# Patient Record
Sex: MALE | Race: BLACK OR AFRICAN AMERICAN | Employment: UNEMPLOYED | URBAN - METROPOLITAN AREA
[De-identification: names, ages, dates, MRNs, and addresses within clinical notes are randomized per-mention and may not be internally consistent; named-entity substitution may affect disease eponyms.]

---

## 2020-09-13 ENCOUNTER — HOSPITAL ENCOUNTER (EMERGENCY)
Age: 45
Discharge: HOME | End: 2020-09-13
Attending: EMERGENCY MEDICINE
Payer: MEDICAID

## 2020-09-13 VITALS
SYSTOLIC BLOOD PRESSURE: 147 MMHG | HEART RATE: 98 BPM | WEIGHT: 175 LBS | RESPIRATION RATE: 18 BRPM | BODY MASS INDEX: 23.7 KG/M2 | OXYGEN SATURATION: 97 % | HEIGHT: 72 IN | DIASTOLIC BLOOD PRESSURE: 96 MMHG | TEMPERATURE: 98.6 F

## 2020-09-13 DIAGNOSIS — H61.22 HEARING LOSS OF LEFT EAR DUE TO CERUMEN IMPACTION: Primary | ICD-10-CM

## 2020-09-13 DIAGNOSIS — H92.02 OTALGIA, LEFT EAR: ICD-10-CM

## 2020-09-13 PROCEDURE — 76010010392 HC REMOVAL IMPACTED WAX IRRIGATION/LVG UNI

## 2020-09-13 PROCEDURE — 99283 EMERGENCY DEPT VISIT LOW MDM: CPT

## 2020-09-13 PROCEDURE — 74011250637 HC RX REV CODE- 250/637: Performed by: EMERGENCY MEDICINE

## 2020-09-13 RX ORDER — ACETAMINOPHEN 500 MG
1000 TABLET ORAL
Status: COMPLETED | OUTPATIENT
Start: 2020-09-13 | End: 2020-09-13

## 2020-09-13 RX ORDER — ACETAMINOPHEN 500 MG
TABLET ORAL
Status: DISCONTINUED
Start: 2020-09-13 | End: 2020-09-13 | Stop reason: HOSPADM

## 2020-09-13 RX ADMIN — ACETAMINOPHEN 1000 MG: 500 TABLET, FILM COATED ORAL at 03:24

## 2020-09-13 NOTE — ED TRIAGE NOTES
Patient states left ear pain for the last twelve hours. Patient denies any OTC pain medication. Patient walked here tonight, ETOH+. Patient now states \"something crawled in there\".

## 2020-09-13 NOTE — ED PROVIDER NOTES
HPI   Patient presents with a chief complaint of left ear pain for the past 12 hours. He denies any fever or drainage from the left ear. Patient states that he has been drinking alcohol and he walked to the emergency department. No other complaints noted on arrival to the emergency department. Past Medical History:   Diagnosis Date    Acute renal failure ([address]     Hypertension        History reviewed. No pertinent surgical history. Family History:   Problem Relation Age of Onset    Hypertension Other        Social History     Socioeconomic History    Marital status: SINGLE     Spouse name: Not on file    Number of children: Not on file    Years of education: Not on file    Highest education level: Not on file   Occupational History    Not on file   Social Needs    Financial resource strain: Not on file    Food insecurity     Worry: Not on file     Inability: Not on file    Transportation needs     Medical: Not on file     Non-medical: Not on file   Tobacco Use    Smoking status: Former Smoker     Packs/day: 0.50     Years: 12.00     Pack years: 6.00     Last attempt to quit: 2016     Years since quittin.0    Smokeless tobacco: Never Used   Substance and Sexual Activity    Alcohol use:  Yes     Alcohol/week: 20.0 standard drinks     Types: 24 Standard drinks or equivalent per week     Comment: daily    Drug use: No    Sexual activity: Not on file   Lifestyle    Physical activity     Days per week: Not on file     Minutes per session: Not on file    Stress: Not on file   Relationships    Social connections     Talks on phone: Not on file     Gets together: Not on file     Attends L[employer]-day service: Not on file     Active member of club or organization: Not on file     Attends meetings of clubs or organizations: Not on file     Relationship status: Not on file    Intimate partner violence     Fear of current or ex partner: Not on file     Emotionally abused: Not on file Physically abused: Not on file     Forced sexual activity: Not on file   Other Topics Concern    Not on file   Social History Narrative    Not on file         ALLERGIES: Advil [ibuprofen]    Review of Systems   Constitutional: Negative for chills, fatigue and fever. HENT: Positive for ear pain. Negative for ear discharge, postnasal drip, rhinorrhea, sinus pressure, sinus pain, sore throat and trouble swallowing. Eyes: Negative for pain, discharge and itching. Respiratory: Negative for cough, choking, shortness of breath and wheezing. Cardiovascular: Negative for chest pain, palpitations and leg swelling. Gastrointestinal: Negative for abdominal pain, constipation, nausea and vomiting. Endocrine: Negative for polydipsia, polyphagia and polyuria. Genitourinary: Negative for difficulty urinating, discharge, flank pain and penile pain. Musculoskeletal: Negative for arthralgias, back pain, joint swelling, neck pain and neck stiffness. Allergic/Immunologic: Negative for environmental allergies, food allergies and immunocompromised state. Neurological: Negative for dizziness, seizures and light-headedness. Psychiatric/Behavioral: Negative for agitation, confusion, hallucinations and suicidal ideas. The patient is not hyperactive. Vitals:    09/13/20 0217   BP: (!) 147/96   Pulse: 98   Resp: 18   Temp: 98.6 °F (37 °C)   SpO2: 97%   Weight: 79.4 kg (175 lb)   Height: 6' (1.829 m)            Physical Exam  Vitals signs and nursing note reviewed. Constitutional:       General: He is not in acute distress. Appearance: He is well-developed. He is not diaphoretic. HENT:      Head: Normocephalic and atraumatic. Right Ear: Tympanic membrane and external ear normal.      Left Ear: External ear normal. There is impacted cerumen. Nose: Nose normal.      Mouth/Throat:      Mouth: Mucous membranes are moist.      Pharynx: No oropharyngeal exudate.    Eyes:      General: No scleral icterus. Right eye: No discharge. Left eye: No discharge. Conjunctiva/sclera: Conjunctivae normal.      Pupils: Pupils are equal, round, and reactive to light. Neck:      Musculoskeletal: Normal range of motion and neck supple. Thyroid: No thyromegaly. Vascular: No JVD. Trachea: No tracheal deviation. Cardiovascular:      Rate and Rhythm: Normal rate and regular rhythm. Heart sounds: Normal heart sounds. No murmur. No friction rub. No gallop. Pulmonary:      Effort: Pulmonary effort is normal. No respiratory distress. Breath sounds: Normal breath sounds. No stridor. No wheezing or rales. Chest:      Chest wall: No tenderness. Abdominal:      General: Bowel sounds are normal. There is no distension. Palpations: Abdomen is soft. There is no mass. Tenderness: There is no abdominal tenderness. There is no guarding or rebound. Musculoskeletal: Normal range of motion. General: No tenderness. Lymphadenopathy:      Cervical: No cervical adenopathy. Skin:     General: Skin is warm and dry. Coloration: Skin is not pale. Findings: No erythema or rash. Neurological:      Mental Status: He is alert and oriented to person, place, and time. Cranial Nerves: No cranial nerve deficit. Motor: No abnormal muscle tone. Coordination: Coordination normal.      Deep Tendon Reflexes: Reflexes are normal and symmetric. Psychiatric:         Behavior: Behavior normal.         Thought Content:  Thought content normal.         Judgment: Judgment normal.          MDM  Number of Diagnoses or Management Options  Hearing loss of left ear due to cerumen impaction:   Otalgia, left ear:      Amount and/or Complexity of Data Reviewed  Review and summarize past medical records: yes    Risk of Complications, Morbidity, and/or Mortality  Presenting problems: low  Management options: low    Patient Progress  Patient progress: stable         EAR CERUMEN REMOVAL NOTEWRITER (ASAP ONLY)    Date/Time: 9/13/2020 3:24 AM  Performed by: Ashlyn Thompson DO  Authorized by: Ashlyn Thompson DO     Consent:     Consent obtained:  Verbal    Risks discussed:  Bleeding, infection, incomplete removal, pain and TM perforation    Alternatives discussed:  Delayed treatment, observation and no treatment  Procedure details:     Location:  L ear    Procedure type: irrigation    Post-procedure details: Inspection:  TM intact    Hearing quality:  Improved    Patient tolerance of procedure: Tolerated well, no immediate complications        Diagnosis:   1. Hearing loss of left ear due to cerumen impaction    2. Otalgia, left ear          Disposition: Discharge home    Follow-up Information     Follow up With Specialties Details Why Contact Info    Justo Grier MD Family Medicine Schedule an appointment as soon as possible for a visit in 2 days  [address] [place] [id number]  [phone]      [facility] EMERGENCY DEPT Emergency Medicine  As needed, If symptoms worsen [address] [facility] [address]  [phone]          Patient's Medications   Start Taking    CARBAMIDE PEROXIDE (DEBROX) 6.5 % OTIC SOLUTION    Administer 5 Drops into each ear two (2) times a day for 4 days. Continue Taking    ATENOLOL (TENORMIN) 100 MG TABLET    Take 100 mg by mouth daily. These Medications have changed    No medications on file   Stop Taking    ACETAMINOPHEN-CODEINE (TYLENOL-CODEINE #3) 300-30 MG PER TABLET    Take 1 Tab by mouth every six (6) hours as needed for Pain. CYCLOBENZAPRINE (FLEXERIL) 10 MG TABLET    Take 1 Tab by mouth two (2) times a day.

## 2022-12-05 ENCOUNTER — APPOINTMENT (OUTPATIENT)
Dept: GENERAL RADIOLOGY | Age: 47
End: 2022-12-05
Attending: EMERGENCY MEDICINE
Payer: MEDICAID

## 2022-12-05 ENCOUNTER — APPOINTMENT (OUTPATIENT)
Dept: VASCULAR SURGERY | Age: 47
End: 2022-12-05
Attending: PHYSICIAN ASSISTANT
Payer: MEDICAID

## 2022-12-05 ENCOUNTER — APPOINTMENT (OUTPATIENT)
Dept: CT IMAGING | Age: 47
End: 2022-12-05
Attending: PHYSICIAN ASSISTANT
Payer: MEDICAID

## 2022-12-05 ENCOUNTER — HOSPITAL ENCOUNTER (EMERGENCY)
Age: 47
Discharge: HOME | End: 2022-12-05
Attending: EMERGENCY MEDICINE
Payer: MEDICAID

## 2022-12-05 VITALS
TEMPERATURE: 98.7 F | HEART RATE: 83 BPM | SYSTOLIC BLOOD PRESSURE: 163 MMHG | OXYGEN SATURATION: 99 % | DIASTOLIC BLOOD PRESSURE: 99 MMHG | RESPIRATION RATE: 20 BRPM

## 2022-12-05 DIAGNOSIS — M79.89 SWELLING OF RIGHT HAND: Primary | ICD-10-CM

## 2022-12-05 LAB
ALBUMIN SERPL-MCNC: 3 G/DL (ref 3.4–5)
ALBUMIN/GLOB SERPL: 0.7 {RATIO} (ref 0.8–1.7)
ALP SERPL-CCNC: 60 U/L (ref 45–117)
ALT SERPL-CCNC: 27 U/L (ref 16–61)
AMPHET UR QL SCN: NEGATIVE
ANION GAP SERPL CALC-SCNC: 5 MMOL/L (ref 3–18)
APTT PPP: 29.5 SEC (ref 23–36.4)
AST SERPL-CCNC: 31 U/L (ref 10–38)
BARBITURATES UR QL SCN: NEGATIVE
BASOPHILS # BLD: 0 K/UL (ref 0–0.1)
BASOPHILS NFR BLD: 0 % (ref 0–2)
BENZODIAZ UR QL: NEGATIVE
BILIRUB SERPL-MCNC: 0.4 MG/DL (ref 0.2–1)
BUN SERPL-MCNC: 15 MG/DL (ref 7–18)
BUN/CREAT SERPL: 18 (ref 12–20)
CALCIUM SERPL-MCNC: 9 MG/DL (ref 8.5–10.1)
CANNABINOIDS UR QL SCN: POSITIVE
CHLORIDE SERPL-SCNC: 104 MMOL/L (ref 100–111)
CO2 SERPL-SCNC: 28 MMOL/L (ref 21–32)
COCAINE UR QL SCN: NEGATIVE
CREAT SERPL-MCNC: 0.84 MG/DL (ref 0.6–1.3)
CRP SERPL-MCNC: 7.9 MG/DL (ref 0–0.3)
DIFFERENTIAL METHOD BLD: ABNORMAL
EOSINOPHIL # BLD: 0 K/UL (ref 0–0.4)
EOSINOPHIL NFR BLD: 1 % (ref 0–5)
ERYTHROCYTE [DISTWIDTH] IN BLOOD BY AUTOMATED COUNT: 13.9 % (ref 11.6–14.5)
ERYTHROCYTE [SEDIMENTATION RATE] IN BLOOD: 49 MM/HR (ref 0–15)
ETHANOL SERPL-MCNC: <3 MG/DL (ref 0–3)
GLOBULIN SER CALC-MCNC: 4.5 G/DL (ref 2–4)
GLUCOSE SERPL-MCNC: 91 MG/DL (ref 74–99)
HCT VFR BLD AUTO: 32.6 % (ref 36–48)
HDSCOM,HDSCOM: ABNORMAL
HGB BLD-MCNC: 10.9 G/DL (ref 13–16)
IMM GRANULOCYTES # BLD AUTO: 0 K/UL (ref 0–0.04)
IMM GRANULOCYTES NFR BLD AUTO: 0 % (ref 0–0.5)
INR PPP: 1 (ref 0.8–1.2)
LACTATE BLD-SCNC: 1.11 MMOL/L (ref 0.4–2)
LACTATE BLD-SCNC: 2.06 MMOL/L (ref 0.4–2)
LYMPHOCYTES # BLD: 0.4 K/UL (ref 0.9–3.6)
LYMPHOCYTES NFR BLD: 6 % (ref 21–52)
MAGNESIUM SERPL-MCNC: 1.6 MG/DL (ref 1.6–2.6)
MCH RBC QN AUTO: 30.4 PG (ref 24–34)
MCHC RBC AUTO-ENTMCNC: 33.4 G/DL (ref 31–37)
MCV RBC AUTO: 91.1 FL (ref 78–100)
METHADONE UR QL: NEGATIVE
MONOCYTES # BLD: 1.1 K/UL (ref 0.05–1.2)
MONOCYTES NFR BLD: 17 % (ref 3–10)
NEUTS SEG # BLD: 4.8 K/UL (ref 1.8–8)
NEUTS SEG NFR BLD: 76 % (ref 40–73)
NRBC # BLD: 0 K/UL (ref 0–0.01)
NRBC BLD-RTO: 0 PER 100 WBC
OPIATES UR QL: POSITIVE
PCP UR QL: NEGATIVE
PLATELET # BLD AUTO: 197 K/UL (ref 135–420)
PMV BLD AUTO: 12.1 FL (ref 9.2–11.8)
POTASSIUM SERPL-SCNC: 3.1 MMOL/L (ref 3.5–5.5)
PROT SERPL-MCNC: 7.5 G/DL (ref 6.4–8.2)
PROTHROMBIN TIME: 13.1 SEC (ref 11.5–15.2)
RBC # BLD AUTO: 3.58 M/UL (ref 4.35–5.65)
SODIUM SERPL-SCNC: 137 MMOL/L (ref 136–145)
URATE SERPL-MCNC: 5.4 MG/DL (ref 2.6–7.2)
WBC # BLD AUTO: 6.3 K/UL (ref 4.6–13.2)

## 2022-12-05 PROCEDURE — 85610 PROTHROMBIN TIME: CPT

## 2022-12-05 PROCEDURE — 96375 TX/PRO/DX INJ NEW DRUG ADDON: CPT

## 2022-12-05 PROCEDURE — 85652 RBC SED RATE AUTOMATED: CPT

## 2022-12-05 PROCEDURE — 96376 TX/PRO/DX INJ SAME DRUG ADON: CPT

## 2022-12-05 PROCEDURE — 83735 ASSAY OF MAGNESIUM: CPT

## 2022-12-05 PROCEDURE — 93971 EXTREMITY STUDY: CPT

## 2022-12-05 PROCEDURE — 74011250636 HC RX REV CODE- 250/636: Performed by: PHYSICIAN ASSISTANT

## 2022-12-05 PROCEDURE — 74011000636 HC RX REV CODE- 636: Performed by: EMERGENCY MEDICINE

## 2022-12-05 PROCEDURE — 85730 THROMBOPLASTIN TIME PARTIAL: CPT

## 2022-12-05 PROCEDURE — 74011000258 HC RX REV CODE- 258: Performed by: PHYSICIAN ASSISTANT

## 2022-12-05 PROCEDURE — 82077 ASSAY SPEC XCP UR&BREATH IA: CPT

## 2022-12-05 PROCEDURE — 73130 X-RAY EXAM OF HAND: CPT

## 2022-12-05 PROCEDURE — 80307 DRUG TEST PRSMV CHEM ANLYZR: CPT

## 2022-12-05 PROCEDURE — 80053 COMPREHEN METABOLIC PANEL: CPT

## 2022-12-05 PROCEDURE — 99285 EMERGENCY DEPT VISIT HI MDM: CPT

## 2022-12-05 PROCEDURE — 73201 CT UPPER EXTREMITY W/DYE: CPT

## 2022-12-05 PROCEDURE — 83605 ASSAY OF LACTIC ACID: CPT

## 2022-12-05 PROCEDURE — 86140 C-REACTIVE PROTEIN: CPT

## 2022-12-05 PROCEDURE — 74011250637 HC RX REV CODE- 250/637: Performed by: PHYSICIAN ASSISTANT

## 2022-12-05 PROCEDURE — 85025 COMPLETE CBC W/AUTO DIFF WBC: CPT

## 2022-12-05 PROCEDURE — 96365 THER/PROPH/DIAG IV INF INIT: CPT

## 2022-12-05 PROCEDURE — 84550 ASSAY OF BLOOD/URIC ACID: CPT

## 2022-12-05 RX ORDER — HYDROCODONE BITARTRATE AND ACETAMINOPHEN 5; 325 MG/1; MG/1
1 TABLET ORAL
Status: COMPLETED | OUTPATIENT
Start: 2022-12-05 | End: 2022-12-05

## 2022-12-05 RX ORDER — PREDNISONE 20 MG/1
20 TABLET ORAL DAILY
Qty: 5 TABLET | Refills: 0 | Status: SHIPPED | OUTPATIENT
Start: 2022-12-05 | End: 2022-12-10

## 2022-12-05 RX ORDER — MORPHINE SULFATE 4 MG/ML
4 INJECTION INTRAVENOUS
Status: COMPLETED | OUTPATIENT
Start: 2022-12-05 | End: 2022-12-05

## 2022-12-05 RX ORDER — CEPHALEXIN 500 MG/1
500 CAPSULE ORAL 4 TIMES DAILY
Qty: 28 CAPSULE | Refills: 0 | Status: SHIPPED | OUTPATIENT
Start: 2022-12-05 | End: 2022-12-12

## 2022-12-05 RX ORDER — ACETAMINOPHEN 325 MG/1
650 TABLET ORAL
Qty: 20 TABLET | Refills: 0 | Status: SHIPPED | OUTPATIENT
Start: 2022-12-05

## 2022-12-05 RX ADMIN — IOPAMIDOL 100 ML: 612 INJECTION, SOLUTION INTRAVENOUS at 19:15

## 2022-12-05 RX ADMIN — METHYLPREDNISOLONE SODIUM SUCCINATE 125 MG: 125 INJECTION, POWDER, FOR SOLUTION INTRAMUSCULAR; INTRAVENOUS at 15:36

## 2022-12-05 RX ADMIN — SODIUM CHLORIDE 1000 ML: 900 INJECTION, SOLUTION INTRAVENOUS at 17:30

## 2022-12-05 RX ADMIN — PIPERACILLIN AND TAZOBACTAM 4.5 G: 4; .5 INJECTION, POWDER, FOR SOLUTION INTRAVENOUS at 16:23

## 2022-12-05 RX ADMIN — MORPHINE SULFATE 4 MG: 4 INJECTION, SOLUTION INTRAMUSCULAR; INTRAVENOUS at 15:35

## 2022-12-05 RX ADMIN — HYDROCODONE BITARTRATE AND ACETAMINOPHEN 1 TABLET: 5; 325 TABLET ORAL at 21:52

## 2022-12-05 RX ADMIN — MORPHINE SULFATE 4 MG: 4 INJECTION, SOLUTION INTRAMUSCULAR; INTRAVENOUS at 16:35

## 2022-12-05 RX ADMIN — SODIUM CHLORIDE 440 ML: 900 INJECTION, SOLUTION INTRAVENOUS at 18:10

## 2022-12-05 RX ADMIN — SODIUM CHLORIDE 1000 ML: 9 INJECTION, SOLUTION INTRAVENOUS at 16:23

## 2022-12-05 NOTE — ROUTINE PROCESS
Per ELISE Main, due to triphasic arterial flow being noted during the venous study, the arterial study can be held off for now.

## 2022-12-05 NOTE — Clinical Note
[address] [place] [address] [facility] EMERGENCY DEPT  [id number] [facility] [address] [phone]    Work/School Note    Date: 12/5/2022    To Whom It May concern:    Alfredo Vick was seen and treated today in the emergency room by the following provider(s):  Attending Provider: Nadya Mojica MD  Physician Assistant: Elizabeth Phillips. Alfredo Vick is excused from work/school on 12/05/22 and 12/06/22. He is medically clear to return to work/school on 12/7/2022.        Sincerely,          ELISE Marks

## 2022-12-05 NOTE — ED PROVIDER NOTES
EMERGENCY DEPARTMENT HISTORY AND PHYSICAL EXAM    Date: 12/5/2022  Patient Name: Elder Crane    History of Presenting Illness     Chief Complaint   Patient presents with    Hand Swelling         History Provided By: Patient     Chief Complaint: Right hand pain and swelling   Duration: 2-3 weeks   Timing: Intermittent  Location: Right dorsal hand  Quality: Painful  Severity: Moderate  Modifying Factors: None  Associated Symptoms: none       Additional History (Context): Elder Crane is a 50 y.o. male with a history of hypertension, renal failure and alcohol abuse who presents today for issues listed above. Patient reports he is no longer drinking alcohol and is now a member of [employer]. Patient reports for the past couple weeks he has noticed that his right hand has been swelling and then improving and then swelling again and improving. Patient denies any known trauma or injury. Denies any IV drug abuse history. Denies any known history of gout or arthritis. Denies any recent fevers or chills. Denies any other concerns at this time. PCP: Yolie Delgadillo MD    Current Facility-Administered Medications   Medication Dose Route Frequency Provider Last Rate Last Admin    sodium chloride 0.9 % bolus infusion 440 mL  440 mL IntraVENous ONCE [place]         Current Outpatient Medications   Medication Sig Dispense Refill    predniSONE (DELTASONE) 20 mg tablet Take 1 Tablet by mouth daily for 5 days. With Breakfast 5 Tablet 0    cephALEXin (Keflex) 500 mg capsule Take 1 Capsule by mouth four (4) times daily for 7 days. 28 Capsule 0    acetaminophen (TYLENOL) 325 mg tablet Take 2 Tablets by mouth every four (4) hours as needed for Pain. 20 Tablet 0    atenolol (TENORMIN) 100 mg tablet Take 100 mg by mouth daily.          Past History     Past Medical History:  Past Medical History:   Diagnosis Date    Acute renal failure ([address]     Hypertension        Past Surgical History:  No past surgical history on file. Family History:  Family History   Problem Relation Age of Onset    Hypertension Other        Social History:  Social History     Tobacco Use    Smoking status: Former     Packs/day: 0.50     Years: 12.00     Pack years: 6.00     Types: Cigarettes     Quit date: 2016     Years since quittin.2    Smokeless tobacco: Never   Substance Use Topics    Alcohol use: Yes     Alcohol/week: 20.0 standard drinks     Types: 24 Standard drinks or equivalent per week     Comment: daily    Drug use: No       Allergies: Allergies   Allergen Reactions    Advil [Ibuprofen] Other (comments)     Per patient- not allergic to motrin and taking motrin PO         Review of Systems   Review of Systems   Constitutional:  Negative for chills and fever. HENT:  Negative for congestion, rhinorrhea and sore throat. Respiratory:  Negative for cough and shortness of breath. Cardiovascular:  Negative for chest pain. Gastrointestinal:  Negative for abdominal pain, blood in stool, constipation, diarrhea, nausea and vomiting. Genitourinary:  Negative for dysuria, frequency and hematuria. Musculoskeletal:  Positive for arthralgias and joint swelling. Negative for back pain and myalgias. Skin:  Negative for rash and wound. Neurological:  Negative for dizziness and headaches. All other systems reviewed and are negative. All Other Systems Negative  Physical Exam     Vitals:    22 1428 22 1429 22 1656   BP:  (!) 187/108    Pulse:  (!) 108 79   Resp: 18     Temp:  98.7 °F (37.1 °C)    SpO2: 98% 100%      Physical Exam  Vitals and nursing note reviewed. Constitutional:       General: He is not in acute distress. Appearance: He is well-developed. He is not diaphoretic. HENT:      Head: Normocephalic and atraumatic. Eyes:      Conjunctiva/sclera: Conjunctivae normal.   Cardiovascular:      Rate and Rhythm: Normal rate and regular rhythm. Heart sounds: Normal heart sounds. Pulmonary:      Effort: Pulmonary effort is normal. No respiratory distress. Breath sounds: Normal breath sounds. Chest:      Chest wall: No tenderness. Abdominal:      General: Bowel sounds are normal. There is no distension. Palpations: Abdomen is soft. Tenderness: There is no abdominal tenderness. There is no guarding or rebound. Musculoskeletal:         General: No deformity. Normal range of motion. Right upper arm: Tenderness present. Cervical back: Normal range of motion and neck supple. Comments: Right dorsal hand swelling noted to the right hand, decreased ROM of all digits and joint spaces secondary to pain. Skin intact. No erythema or warmth. Diffuse right upper ext tenderness throughout    Skin:     General: Skin is warm and dry. Neurological:      Mental Status: He is alert and oriented to person, place, and time. Diagnostic Study Results     Labs -     Recent Results (from the past 12 hour(s))   METABOLIC PANEL, COMPREHENSIVE    Collection Time: 12/05/22  2:55 PM   Result Value Ref Range    Sodium 137 136 - 145 mmol/L    Potassium 3.1 (L) 3.5 - 5.5 mmol/L    Chloride 104 100 - 111 mmol/L    CO2 28 21 - 32 mmol/L    Anion gap 5 3.0 - 18 mmol/L    Glucose 91 74 - 99 mg/dL    BUN 15 7.0 - 18 MG/DL    Creatinine 0.84 0.6 - 1.3 MG/DL    BUN/Creatinine ratio 18 12 - 20      eGFR >60 >60 ml/min/1.73m2    Calcium 9.0 8.5 - 10.1 MG/DL    Bilirubin, total 0.4 0.2 - 1.0 MG/DL    ALT (SGPT) 27 16 - 61 U/L    AST (SGOT) 31 10 - 38 U/L    Alk.  phosphatase 60 45 - 117 U/L    Protein, total 7.5 6.4 - 8.2 g/dL    Albumin 3.0 (L) 3.4 - 5.0 g/dL    Globulin 4.5 (H) 2.0 - 4.0 g/dL    A-G Ratio 0.7 (L) 0.8 - 1.7     CBC WITH AUTOMATED DIFF    Collection Time: 12/05/22  2:55 PM   Result Value Ref Range    WBC 6.3 4.6 - 13.2 K/uL    RBC 3.58 (L) 4.35 - 5.65 M/uL    HGB 10.9 (L) 13.0 - 16.0 g/dL    HCT 32.6 (L) 36.0 - 48.0 %    MCV 91.1 78.0 - 100.0 FL    MCH 30.4 24.0 - 34.0 PG    MCHC 33.4 31.0 - 37.0 g/dL    RDW 13.9 11.6 - 14.5 %    PLATELET 951 717 - 596 K/uL    MPV 12.1 (H) 9.2 - 11.8 FL    NRBC 0.0 0  WBC    ABSOLUTE NRBC 0.00 0.00 - 0.01 K/uL    NEUTROPHILS 76 (H) 40 - 73 %    LYMPHOCYTES 6 (L) 21 - 52 %    MONOCYTES 17 (H) 3 - 10 %    EOSINOPHILS 1 0 - 5 %    BASOPHILS 0 0 - 2 %    IMMATURE GRANULOCYTES 0 0.0 - 0.5 %    ABS. NEUTROPHILS 4.8 1.8 - 8.0 K/UL    ABS. LYMPHOCYTES 0.4 (L) 0.9 - 3.6 K/UL    ABS. MONOCYTES 1.1 0.05 - 1.2 K/UL    ABS. EOSINOPHILS 0.0 0.0 - 0.4 K/UL    ABS. BASOPHILS 0.0 0.0 - 0.1 K/UL    ABS. IMM.  GRANS. 0.0 0.00 - 0.04 K/UL    DF AUTOMATED     MAGNESIUM    Collection Time: 12/05/22  2:55 PM   Result Value Ref Range    Magnesium 1.6 1.6 - 2.6 mg/dL   URIC ACID    Collection Time: 12/05/22  2:55 PM   Result Value Ref Range    Uric acid 5.4 2.6 - 7.2 MG/DL   SED RATE (ESR)    Collection Time: 12/05/22  2:55 PM   Result Value Ref Range    Sed rate, automated 49 (H) 0 - 15 mm/hr   C REACTIVE PROTEIN, QT    Collection Time: 12/05/22  2:55 PM   Result Value Ref Range    C-Reactive protein 7.9 (H) 0 - 0.3 mg/dL   PTT    Collection Time: 12/05/22  2:55 PM   Result Value Ref Range    aPTT 29.5 23.0 - 36.4 SEC   PROTHROMBIN TIME + INR    Collection Time: 12/05/22  2:55 PM   Result Value Ref Range    Prothrombin time 13.1 11.5 - 15.2 sec    INR 1.0 0.8 - 1.2     ETHYL ALCOHOL    Collection Time: 12/05/22  2:55 PM   Result Value Ref Range    ALCOHOL(ETHYL),SERUM <3 0 - 3 MG/DL   POC LACTIC ACID    Collection Time: 12/05/22  3:08 PM   Result Value Ref Range    Lactic Acid (POC) 2.06 (HH) 0.40 - 2.00 mmol/L   DRUG SCREEN, URINE    Collection Time: 12/05/22  4:00 PM   Result Value Ref Range    BENZODIAZEPINES Negative NEG      BARBITURATES Negative NEG      THC (TH-CANNABINOL) Positive (A) NEG      OPIATES Positive (A) NEG      PCP(PHENCYCLIDINE) Negative NEG      COCAINE Negative NEG      AMPHETAMINES Negative NEG      METHADONE Negative NEG      HDSCOM (NOTE) POC LACTIC ACID    Collection Time: 12/05/22  4:52 PM   Result Value Ref Range    Lactic Acid (POC) 1.11 0.40 - 2.00 mmol/L       Radiologic Studies -   XR HAND RT MIN 3 V   Final Result   Extensive soft tissue swelling. No soft tissue gas or acute osseous abnormality. DUPLEX UPPER EXT VENOUS RIGHT    (Results Pending)   CT UP EXT RT W CONT    (Results Pending)   UPPER EXT ART PVR MULT LEVEL SEG PRESSURES    (Results Pending)     CT Results  (Last 48 hours)      None          CXR Results  (Last 48 hours)      None              Medical Decision Making   I am the first provider for this patient. I reviewed the vital signs, available nursing notes, past medical history, past surgical history, family history and social history. Vital Signs-Reviewed the patient's vital signs. Records Reviewed: Nursing Notes and Old Medical Records     Procedures: None   Procedures    Provider Notes (Medical Decision Making):     Differential: Gout, OA, RA, thrombus, phlebitis, abscess, cellulitis, tenosynovitis    Plan: Will order a full work-up to include labs, lactic, blood cultures home pain medication, steroids, x-ray, and ultrasound    ED Course as of 12/05/22 1948   Mon Dec 05, 2022   1559 Lactic was 2.06. Will order appropriate fluid bolus and abx [CS]   1606 Doppler overall reassuring as far as thrombus is concerned however there is concern for abscess. Will order CT of upper arm with contrast. [CS]   1655 Repeat lactic within normal limits. Fluids are running and pending CT at this time. [CS]      ED Course User Index  [CS] Glenn Wilkins [place]     Sepsis 3-6 hour reevaluation and exam:        Vital Signs:   Patient Vitals for the past 12 hrs:   Temp Pulse Resp BP SpO2   12/05/22 1656 -- 79 -- -- --   12/05/22 1429 98.7 °F (37.1 °C) (!) 108 -- (!) 187/108 100 %   12/05/22 1428 -- -- 18 -- 98 %     Cardiopulmonary assessment:  RESP: Chest clear, equal breath sounds. CV: S1 and S2 WNL;  No murmurs, gallops or rubs.  Capillary refill:  <4 seconds   Peripheral pulse: strong and regular     Skin exam:  Skin color: normal  Skin Turgor: normal     Sepsis 3-6 hour reevaluation and exam performed at 5:32 PM     7:48 PM : Pt care transferred to [facility] ,ED provider. History of patient complaint(s), available diagnostic reports and current treatment plan has been discussed thoroughly. Bedside rounding on patient occured : No   Intended disposition of patient : TBD  Pending diagnostics reports and/or labs (please list): Ct and reassessment         MED RECONCILIATION:  Current Facility-Administered Medications   Medication Dose Route Frequency    sodium chloride 0.9 % bolus infusion 440 mL  440 mL IntraVENous ONCE     Current Outpatient Medications   Medication Sig    predniSONE (DELTASONE) 20 mg tablet Take 1 Tablet by mouth daily for 5 days. With Breakfast    cephALEXin (Keflex) 500 mg capsule Take 1 Capsule by mouth four (4) times daily for 7 days. acetaminophen (TYLENOL) 325 mg tablet Take 2 Tablets by mouth every four (4) hours as needed for Pain. atenolol (TENORMIN) 100 mg tablet Take 100 mg by mouth daily. Disposition:  Home     DISCHARGE NOTE:   Pt has been reexamined. Patient has no new complaints, changes, or physical findings. Care plan outlined and precautions discussed. Results of workup were reviewed with the patient. All medications were reviewed with the patient. All of pt's questions and concerns were addressed. Patient was instructed and agrees to follow up with PCP as well as to return to the ED upon further deterioration. Patient is ready to go home.     Follow-up Information       Follow up With Specialties Details Why Contact Info    [facility] Emergency Medicine  As needed [address]  [phone]    Gris Peacock MD Family Medicine Schedule an appointment as soon as possible for a visit   [address] [place] [address] [phone] Current Discharge Medication List        START taking these medications    Details   predniSONE (DELTASONE) 20 mg tablet Take 1 Tablet by mouth daily for 5 days. With Breakfast  Qty: 5 Tablet, Refills: 0  Start date: 12/5/2022, End date: 12/10/2022      cephALEXin (Keflex) 500 mg capsule Take 1 Capsule by mouth four (4) times daily for 7 days. Qty: 28 Capsule, Refills: 0  Start date: 12/5/2022, End date: 12/12/2022      acetaminophen (TYLENOL) 325 mg tablet Take 2 Tablets by mouth every four (4) hours as needed for Pain. Qty: 20 Tablet, Refills: 0  Start date: 12/5/2022                 Diagnosis     Clinical Impression:   1. Swelling of right hand          \"Please note that this dictation was completed with [employer], the computer voice recognition software. Quite often unanticipated grammatical, syntax, homophones, and other interpretive errors are inadvertently transcribed by the computer software. Please disregard these errors. Please excuse any errors that have escaped final proofreading. \"

## 2022-12-05 NOTE — Clinical Note
[address] [place] [address] [facility] EMERGENCY DEPT  [address] [address] [phone]    Work/School Note    Date: 12/5/2022    To Whom It May concern:    Rossy Hernandez was seen and treated today in the emergency room by the following provider(s):  Attending Provider: Josi Bowles MD  Physician Assistant: Elizabeth Pringle. Rossy Hernandez is excused from work/school on 12/05/22 and 12/06/22. He is medically clear to return to work/school on 12/7/2022.        Sincerely,          ELISE Hartley

## 2022-12-05 NOTE — ROUTINE PROCESS
Called ED informed RN patient has triphasic arterial flow to the wrist.  Hand appears to have abcess. Please see Vascular Duplex Upper Ext Venous Right for images. Performed by Colton.

## 2022-12-05 NOTE — PROGRESS NOTES
Pharmacy Note     Zosyn 3.375 gm Now  ordered for treatment of SSTI. Per Radha Andujar 10 will be changed to 4.5 gm x 1 dose now. .     Estimated Creatinine Clearance: CrCl cannot be calculated (Unknown ideal weight.). Dialysis Status, EVE, CKD: unknown    BMI:  There is no height or weight on file to calculate BMI. Rationale for Adjustment:  [facility] B-Lactam extended infusion policy    Pharmacy will continue to monitor and adjust dose as necessary. Please call with any questions.     Thank you,  Nata Eugene, [facility]

## 2022-12-06 NOTE — ED NOTES
7:37 PM :Pt care assumed from Zuhair Wall [facility], ED provider. Pt complaint(s), current treatment plan, progression and available diagnostic results have been discussed thoroughly. The patient was seen and evaluated on my shift. Rounding occurred: no  Intended Disposition: home  Pending diagnostic reports and/or labs (please list): CT R hand      Abnormal lab results from this emergency department encounter:  Labs Reviewed   METABOLIC PANEL, COMPREHENSIVE - Abnormal; Notable for the following components:       Result Value    Potassium 3.1 (*)     Albumin 3.0 (*)     Globulin 4.5 (*)     A-G Ratio 0.7 (*)     All other components within normal limits   CBC WITH AUTOMATED DIFF - Abnormal; Notable for the following components:    RBC 3.58 (*)     HGB 10.9 (*)     HCT 32.6 (*)     MPV 12.1 (*)     NEUTROPHILS 76 (*)     LYMPHOCYTES 6 (*)     MONOCYTES 17 (*)     ABS.  LYMPHOCYTES 0.4 (*)     All other components within normal limits   SED RATE (ESR) - Abnormal; Notable for the following components:    Sed rate, automated 49 (*)     All other components within normal limits   C REACTIVE PROTEIN, QT - Abnormal; Notable for the following components:    C-Reactive protein 7.9 (*)     All other components within normal limits   DRUG SCREEN, URINE - Abnormal; Notable for the following components:    THC (TH-CANNABINOL) Positive (*)     OPIATES Positive (*)     All other components within normal limits   POC LACTIC ACID - Abnormal; Notable for the following components:    Lactic Acid (POC) 2.06 (*)     All other components within normal limits   MAGNESIUM   URIC ACID   PTT   PROTHROMBIN TIME + INR   ETHYL ALCOHOL   POC LACTIC ACID       Lab values for this patient within approximately the last 12 hours:  Recent Results (from the past 12 hour(s))   METABOLIC PANEL, COMPREHENSIVE    Collection Time: 12/05/22  2:55 PM   Result Value Ref Range    Sodium 137 136 - 145 mmol/L    Potassium 3.1 (L) 3.5 - 5.5 mmol/L    Chloride 104 100 - 111 mmol/L    CO2 28 21 - 32 mmol/L    Anion gap 5 3.0 - 18 mmol/L    Glucose 91 74 - 99 mg/dL    BUN 15 7.0 - 18 MG/DL    Creatinine 0.84 0.6 - 1.3 MG/DL    BUN/Creatinine ratio 18 12 - 20      eGFR >60 >60 ml/min/1.73m2    Calcium 9.0 8.5 - 10.1 MG/DL    Bilirubin, total 0.4 0.2 - 1.0 MG/DL    ALT (SGPT) 27 16 - 61 U/L    AST (SGOT) 31 10 - 38 U/L    Alk. phosphatase 60 45 - 117 U/L    Protein, total 7.5 6.4 - 8.2 g/dL    Albumin 3.0 (L) 3.4 - 5.0 g/dL    Globulin 4.5 (H) 2.0 - 4.0 g/dL    A-G Ratio 0.7 (L) 0.8 - 1.7     CBC WITH AUTOMATED DIFF    Collection Time: 12/05/22  2:55 PM   Result Value Ref Range    WBC 6.3 4.6 - 13.2 K/uL    RBC 3.58 (L) 4.35 - 5.65 M/uL    HGB 10.9 (L) 13.0 - 16.0 g/dL    HCT 32.6 (L) 36.0 - 48.0 %    MCV 91.1 78.0 - 100.0 FL    MCH 30.4 24.0 - 34.0 PG    MCHC 33.4 31.0 - 37.0 g/dL    RDW 13.9 11.6 - 14.5 %    PLATELET 103 589 - 142 K/uL    MPV 12.1 (H) 9.2 - 11.8 FL    NRBC 0.0 0  WBC    ABSOLUTE NRBC 0.00 0.00 - 0.01 K/uL    NEUTROPHILS 76 (H) 40 - 73 %    LYMPHOCYTES 6 (L) 21 - 52 %    MONOCYTES 17 (H) 3 - 10 %    EOSINOPHILS 1 0 - 5 %    BASOPHILS 0 0 - 2 %    IMMATURE GRANULOCYTES 0 0.0 - 0.5 %    ABS. NEUTROPHILS 4.8 1.8 - 8.0 K/UL    ABS. LYMPHOCYTES 0.4 (L) 0.9 - 3.6 K/UL    ABS. MONOCYTES 1.1 0.05 - 1.2 K/UL    ABS. EOSINOPHILS 0.0 0.0 - 0.4 K/UL    ABS. BASOPHILS 0.0 0.0 - 0.1 K/UL    ABS. IMM.  GRANS. 0.0 0.00 - 0.04 K/UL    DF AUTOMATED     MAGNESIUM    Collection Time: 12/05/22  2:55 PM   Result Value Ref Range    Magnesium 1.6 1.6 - 2.6 mg/dL   URIC ACID    Collection Time: 12/05/22  2:55 PM   Result Value Ref Range    Uric acid 5.4 2.6 - 7.2 MG/DL   SED RATE (ESR)    Collection Time: 12/05/22  2:55 PM   Result Value Ref Range    Sed rate, automated 49 (H) 0 - 15 mm/hr   C REACTIVE PROTEIN, QT    Collection Time: 12/05/22  2:55 PM   Result Value Ref Range    C-Reactive protein 7.9 (H) 0 - 0.3 mg/dL   PTT    Collection Time: 12/05/22  2:55 PM   Result Value Ref Range    aPTT 29.5 23.0 - 36.4 SEC   PROTHROMBIN TIME + INR    Collection Time: 12/05/22  2:55 PM   Result Value Ref Range    Prothrombin time 13.1 11.5 - 15.2 sec    INR 1.0 0.8 - 1.2     ETHYL ALCOHOL    Collection Time: 12/05/22  2:55 PM   Result Value Ref Range    ALCOHOL(ETHYL),SERUM <3 0 - 3 MG/DL   POC LACTIC ACID    Collection Time: 12/05/22  3:08 PM   Result Value Ref Range    Lactic Acid (POC) 2.06 (HH) 0.40 - 2.00 mmol/L   DRUG SCREEN, URINE    Collection Time: 12/05/22  4:00 PM   Result Value Ref Range    BENZODIAZEPINES Negative NEG      BARBITURATES Negative NEG      THC (TH-CANNABINOL) Positive (A) NEG      OPIATES Positive (A) NEG      PCP(PHENCYCLIDINE) Negative NEG      COCAINE Negative NEG      AMPHETAMINES Negative NEG      METHADONE Negative NEG      HDSCOM (NOTE)    POC LACTIC ACID    Collection Time: 12/05/22  4:52 PM   Result Value Ref Range    Lactic Acid (POC) 1.11 0.40 - 2.00 mmol/L       Radiologist and cardiologist interpretations if available at time of this note:  XR HAND RT MIN 3 V    Result Date: 12/5/2022  EXAM:  XR HAND RT MIN 3 V INDICATION:  hand swelling COMPARISON: None. FINDINGS: 3  views of the right hand demonstrate no fracture or malalignment. Mild degenerative changes first CMC joint. Other joint spaces are maintained. Extensive diffuse soft tissue swelling without soft tissue gas or radiopaque foreign body. .       Extensive soft tissue swelling. No soft tissue gas or acute osseous abnormality. CT UP EXT RT W CONT    Result Date: 12/5/2022  CT right forearm with Intravenous Contrast INDICATION: Right hand pain and swelling. TECHNIQUE: Contrast enhanced CT of the right forearm. Dose reduction techniques used: Automated exposure control, adjustment of the mAs and/or kVp according to patient size, standardized low-dose protocol, and/or iterative reconstruction technique. COMPARISON: Radiograph correlation from earlier tonight.   FINDINGS: There is subcutaneous edema and stranding overlying the olecranon. There is an elbow joint effusion. There are mild degenerative changes of the elbow. There is severe soft tissue swelling of the dorsum of the hand. There is no focal fluid collection appreciated. The bones demonstrate normal alignment. There is no acute fracture or dislocation. Severe soft tissue swelling of the dorsum of the hand without focal fluid collection. Soft tissue swelling overlying the olecranon of the elbow. There is also an elbow joint effusion of uncertain etiology. No definite fracture is identified, but occult fracture cannot be excluded on the basis of CT. Medication(s) ordered for patient during this emergency visit encounter:  Medications   methylPREDNISolone (PF) (Solu-MEDROL) injection 125 mg (125 mg IntraVENous Given 12/5/22 1536)   morphine injection 4 mg (4 mg IntraVENous Given 12/5/22 1535)   piperacillin-tazobactam (ZOSYN) 4.5 g in 0.9% sodium chloride (MBP/ADV) 100 mL MBP (0 g IntraVENous IV Completed 12/5/22 1653)   sodium chloride 0.9 % bolus infusion 1,000 mL (0 mL IntraVENous IV Completed 12/5/22 1715)     Followed by   sodium chloride 0.9 % bolus infusion 1,000 mL (0 mL IntraVENous IV Completed 12/5/22 1810)     Followed by   sodium chloride 0.9 % bolus infusion 440 mL (0 mL IntraVENous IV Completed 12/5/22 1845)   morphine injection 4 mg (4 mg IntraVENous Given 12/5/22 1635)   iopamidoL (ISOVUE 300) 300 mg iodine /mL (61 %) contrast injection  mL (100 mL IntraVENous Given 12/5/22 1915)   HYDROcodone-acetaminophen (NORCO) 5-325 mg per tablet 1 Tablet (1 Tablet Oral Given 12/5/22 2152)        9:40 PM: Discussed with Dr. Bonny Xiong, attending, who evaluated patient at bedside. Reviewed results and plan with patient. Discussed need for close outpatient follow-up this week for reassessment. Discussed strict return precautions, including  fever, increased swelling, or any other medical concerns. Pt in agreement with plan, ready to go home.

## 2023-08-31 NOTE — ED PROVIDER NOTES
This patient was evaluated in the ED, although initial hx and physical exam information was obtained by ELISE Zavala, who also dictated a record of this visit. This will serve as my supervisory note and shared attestation. I did perform a substantive portion of the visit including all aspects of the Medical Decision Making. 75-year-old male, presenting with right hand swelling. Patient reports that this has been going on for few weeks. He states that the pain is intermittent with no specific alleviating or exacerbating factors. Swelling has been consistent throughout. He denies any fevers. Denies any trauma. States that he has been trying to swing his arm down to see if that helps but has not caused any improvement. On exam: Significant swelling to the dorsum of the right hand that is soft, no palpable fluctuance or induration. No significant erythema or warmth. Patient does have less than 2-second capillary refill, 2+ radial pulses. He does have some pain that is reproducible with flexion extension at the wrist but still able to move all fingers as well as the wrist.  He has no pain with palpation over the right elbow and is able to flex and extend at the right elbow. Patient's lab work initially showed a mildly elevated lactic acid that then normalized. CT scan showed soft tissue swelling but was otherwise unremarkable. Ultrasound was not consistent with DVT. I have low suspicion that this is infectious including septic joint, tenosynovitis, abscess or cellulitis especially given the appearance and the few weeks it has been going on. I did discuss with him possibilities that this is more related to arthritis, gout. Patient was counseled on elevating the limb. He will be discharged with steroids to treat more of an arthritic type picture. He will also be discharged with pain medicines. Counseled to follow-up with his primary care doctor.     Clinical Impression: Right hand swelling    Please note this report has been produced using speech recognition software and may contain errors related to that system including errors in grammar, punctuation, and spelling, as well as words and phrases that may be inappropriate. If there are questions or concerns please feel free to contact the dictating provider for clarification. POST-OP DIAGNOSIS:  Hip fracture, right 31-Aug-2023 21:17:06  Mey Vidales